# Patient Record
Sex: FEMALE | Race: ASIAN | NOT HISPANIC OR LATINO | URBAN - METROPOLITAN AREA
[De-identification: names, ages, dates, MRNs, and addresses within clinical notes are randomized per-mention and may not be internally consistent; named-entity substitution may affect disease eponyms.]

---

## 2017-12-18 ENCOUNTER — EMERGENCY (EMERGENCY)
Facility: HOSPITAL | Age: 32
LOS: 1 days | Discharge: HOME | End: 2017-12-18
Attending: EMERGENCY MEDICINE | Admitting: EMERGENCY MEDICINE
Payer: COMMERCIAL

## 2017-12-18 VITALS
SYSTOLIC BLOOD PRESSURE: 115 MMHG | RESPIRATION RATE: 16 BRPM | TEMPERATURE: 98 F | OXYGEN SATURATION: 100 % | HEART RATE: 80 BPM | DIASTOLIC BLOOD PRESSURE: 72 MMHG

## 2017-12-18 DIAGNOSIS — Z98.891 HISTORY OF UTERINE SCAR FROM PREVIOUS SURGERY: Chronic | ICD-10-CM

## 2017-12-18 PROCEDURE — 99283 EMERGENCY DEPT VISIT LOW MDM: CPT

## 2017-12-18 RX ORDER — KETOROLAC TROMETHAMINE 30 MG/ML
30 SYRINGE (ML) INJECTION ONCE
Qty: 0 | Refills: 0 | Status: DISCONTINUED | OUTPATIENT
Start: 2017-12-18 | End: 2017-12-18

## 2017-12-18 RX ORDER — ACETAMINOPHEN 500 MG
650 TABLET ORAL ONCE
Qty: 0 | Refills: 0 | Status: COMPLETED | OUTPATIENT
Start: 2017-12-18 | End: 2017-12-18

## 2017-12-18 RX ORDER — DIAZEPAM 5 MG
1 TABLET ORAL
Qty: 15 | Refills: 0 | OUTPATIENT
Start: 2017-12-18 | End: 2017-12-22

## 2017-12-18 RX ORDER — IBUPROFEN 200 MG
600 TABLET ORAL ONCE
Qty: 0 | Refills: 0 | Status: COMPLETED | OUTPATIENT
Start: 2017-12-18 | End: 2017-12-18

## 2017-12-18 RX ORDER — LIDOCAINE 4 G/100G
1 CREAM TOPICAL ONCE
Qty: 0 | Refills: 0 | Status: COMPLETED | OUTPATIENT
Start: 2017-12-18 | End: 2017-12-18

## 2017-12-18 RX ADMIN — Medication 650 MILLIGRAM(S): at 09:35

## 2017-12-18 RX ADMIN — LIDOCAINE 1 PATCH: 4 CREAM TOPICAL at 09:35

## 2017-12-18 RX ADMIN — Medication 600 MILLIGRAM(S): at 09:47

## 2017-12-18 NOTE — ED PROVIDER NOTE - ATTENDING CONTRIBUTION TO CARE
I performed a face to face bedside interview with patient regarding history of present illness, review of symptoms and past medical history. I completed an independent physical exam.  I have discussed patient's plan of care.   I agree with note as stated above, having amended the EMR as needed to reflect my findings. I have discussed the assessment and plan of care.  This includes during the time I functioned as the attending physician for this patient.  Attending Contribution to Care: agree with plan of resident. pt p/w left sided LE and l lower back pain. no concerning signs. no sig trauma. pt stable for d/c pending improvement of symptoms.

## 2017-12-18 NOTE — ED PROVIDER NOTE - MEDICAL DECISION MAKING DETAILS
31 yo F no significant pmhx presenting with 2 weeks waxing/waning lower back pain (lumbar spine that radiates to the left). 8/10 lying still, 10/10 while walking, pt has tried motrin for pain with no relief. No red flag symptoms for lower back pain (no CVA tenderness/urinary symptoms, no history of cancer, no constipation/urinary retention, no trauma), likely musculoskeletal pain vs sciatica, treat pain and reassess

## 2017-12-18 NOTE — ED PROVIDER NOTE - OBJECTIVE STATEMENT
31 yo F no significant pmhx presenting with 2 weeks waxing/waning lower back pain (lumbar spine that radiates to the left). 8/10 lying still, 10/10 while walking, pt has tried motrin for pain with no relief. Denies fevers/chills/hematuria/dysuria/recent trauma. LMP 12/01/17. Denies recent weight loss/decreased appetite/night sweats. Of note patient has three small children at home and states she is always moving around. Pt states when she walks she feels weakness in her R leg with associated R lower back pain and tightness and numbness in her legs.

## 2019-11-13 ENCOUNTER — EMERGENCY (EMERGENCY)
Facility: HOSPITAL | Age: 34
LOS: 1 days | Discharge: HOME | End: 2019-11-13
Admitting: EMERGENCY MEDICINE
Payer: COMMERCIAL

## 2019-11-13 VITALS
TEMPERATURE: 98 F | SYSTOLIC BLOOD PRESSURE: 131 MMHG | RESPIRATION RATE: 16 BRPM | HEART RATE: 78 BPM | DIASTOLIC BLOOD PRESSURE: 102 MMHG | OXYGEN SATURATION: 100 %

## 2019-11-13 DIAGNOSIS — Z98.891 HISTORY OF UTERINE SCAR FROM PREVIOUS SURGERY: Chronic | ICD-10-CM

## 2019-11-13 LAB
APPEARANCE UR: CLEAR — SIGNIFICANT CHANGE UP
BACTERIA # UR AUTO: NEGATIVE — SIGNIFICANT CHANGE UP
BILIRUB UR-MCNC: NEGATIVE — SIGNIFICANT CHANGE UP
BLOOD UR QL VISUAL: HIGH
COLOR SPEC: SIGNIFICANT CHANGE UP
GLUCOSE UR-MCNC: NEGATIVE — SIGNIFICANT CHANGE UP
HYALINE CASTS # UR AUTO: NEGATIVE — SIGNIFICANT CHANGE UP
KETONES UR-MCNC: NEGATIVE — SIGNIFICANT CHANGE UP
LEUKOCYTE ESTERASE UR-ACNC: NEGATIVE — SIGNIFICANT CHANGE UP
NITRITE UR-MCNC: NEGATIVE — SIGNIFICANT CHANGE UP
PH UR: 6 — SIGNIFICANT CHANGE UP (ref 5–8)
PROT UR-MCNC: NEGATIVE — SIGNIFICANT CHANGE UP
RBC CASTS # UR COMP ASSIST: SIGNIFICANT CHANGE UP (ref 0–?)
SP GR SPEC: 1.01 — SIGNIFICANT CHANGE UP (ref 1–1.04)
SQUAMOUS # UR AUTO: SIGNIFICANT CHANGE UP
UROBILINOGEN FLD QL: NORMAL — SIGNIFICANT CHANGE UP
WBC UR QL: SIGNIFICANT CHANGE UP (ref 0–?)

## 2019-11-13 PROCEDURE — 99284 EMERGENCY DEPT VISIT MOD MDM: CPT

## 2019-11-13 PROCEDURE — 76770 US EXAM ABDO BACK WALL COMP: CPT | Mod: 26

## 2019-11-13 RX ORDER — CYCLOBENZAPRINE HYDROCHLORIDE 10 MG/1
10 TABLET, FILM COATED ORAL ONCE
Refills: 0 | Status: COMPLETED | OUTPATIENT
Start: 2019-11-13 | End: 2019-11-13

## 2019-11-13 RX ORDER — KETOROLAC TROMETHAMINE 30 MG/ML
30 SYRINGE (ML) INJECTION ONCE
Refills: 0 | Status: DISCONTINUED | OUTPATIENT
Start: 2019-11-13 | End: 2019-11-13

## 2019-11-13 RX ADMIN — CYCLOBENZAPRINE HYDROCHLORIDE 10 MILLIGRAM(S): 10 TABLET, FILM COATED ORAL at 23:48

## 2019-11-13 RX ADMIN — Medication 30 MILLIGRAM(S): at 23:48

## 2019-11-13 NOTE — ED PROVIDER NOTE - NSFOLLOWUPINSTRUCTIONS_ED_ALL_ED_FT
Patient advised to follow up with PRIMARY CARE DOCTOR IN 1-2 DAYS  and told to return to the emergency department immediately for any new or concerning symptoms such as worsening pain, numbness or tingling, weakness OR ANY OTHER COMPLAINTS. Patient agrees with plan.    DO NOT DRIVE, DRINK ALCOHOL OR OPERATE MACHINERY IF TAKING MUSCLE RELAXER  Rest, avoid heavy lifting/strenuous activity     Advance activity as tolerated.  Continue all previously prescribed medications as directed unless otherwise instructed.  Follow up with your primary care physician in 48-72 hours- bring copies of your results.  Return to the ER for worsening or persistent symptoms, and/or ANY NEW OR CONCERNING SYMPTOMS. If you have issues obtaining follow up, please call: [phone] to obtain a doctor or specialist who takes your insurance in your area.  You may call [phone] to make an appointment with the internal medicine clinic.

## 2019-11-13 NOTE — ED PROVIDER NOTE - PROGRESS NOTE DETAILS
PA [place]- Patient Labs WNL, UA neg. US negative. Pt feeling much better s/p flexeril, toradol, lidoderm patch. Ambulatory in ED. Pt stable for dc home and outpatient f/u with PCP and spine if needed. Sx appear muscular. All questions and concerns addressed. Strict return instructions given.

## 2019-11-13 NOTE — ED ADULT TRIAGE NOTE - CHIEF COMPLAINT QUOTE
Pt comes in for c/o lower back pain and urinary symptoms for the last 2 days. Pt reporting difficulty getting up d/t pain, appears uncomfortable in triage, but in no acute distress.

## 2019-11-13 NOTE — ED PROVIDER NOTE - PATIENT PORTAL LINK FT
You can access the FollowMyHealth Patient Portal offered by [facility] by registering at the following website: [email or web address]. By joining [facility]’s FollowMyHealth portal, you will also be able to view your health information using other applications (apps) compatible with our system.

## 2019-11-13 NOTE — ED PROVIDER NOTE - PHYSICAL EXAMINATION
Vital signs reviewed.   CONSTITUTIONAL: Well-appearing; well-nourished; in no apparent distress. Non-toxic appearing.   HEAD: Normocephalic, atraumatic.  EYES: PERRL, EOM intact, conjunctiva and sclera WNL.  ENT: normal nose; no rhinorrhea;  NECK: Trachea midline   CARD: Normal S1, S2; no murmurs, rubs, or gallops noted.  RESP: Normal chest excursion with respiration; breath sounds clear and equal bilaterally; no wheezes, rhonchi, or rales.  ABD/GI: soft, non-distended; mild suprapubic tenderness noted.   EXT/MS: moves all extremities; no midline tenderness noted. No paraspinal tenderness. NCVAT b/l. 5/5 Strength UE/LE b/l. Pt ambulatory in ED.   SKIN: Normal for age and race; warm; dry; good turgor; no apparent lesions or exudate noted.  NEURO: Awake, alert, oriented x 3, no gross deficits, no motor or sensory deficit noted.  PSYCH: Normal mood; appropriate affect.

## 2019-11-13 NOTE — ED PROVIDER NOTE - OBJECTIVE STATEMENT
HPI: Patient is a 33 y.o female with no significant PMHx who presents to ED c/o low back pain b/l x 1 day as well as dysuria x 2 days. As per patient states that for past 2 days she has been having burning with urination. Then today notes low back pain b/l, described as achy, sore stiffness that is worse with change in position and bending over. Notes some chills. Pt denies heavy lifting, strenuous activity besides holding her child. Pt denies numbness or tingling, n/v/d, abdominal pain, discharge, neck pain, fevers, trauma/falls, hematuria, hx renal stones or any other complaints. Pt took IBU earlier today with no relief. Currently on menstrual cycle, began 2 days ago.

## 2019-11-13 NOTE — ED PROVIDER NOTE - CLINICAL SUMMARY MEDICAL DECISION MAKING FREE TEXT BOX
Patient is a 33 y.o female with no significant PMHx who presents to ED c/o low back pain b/l x 1 day as well as dysuria x 2 days.  DDx- includes but not limited to; MSK pain, UTI, r/o hydronephrosis. Plan- labs, UA/uc, Renal US, flexeril and toradol. Will monitor and reassess.

## 2019-11-14 LAB
ALBUMIN SERPL ELPH-MCNC: 4.3 G/DL — SIGNIFICANT CHANGE UP (ref 3.3–5)
ALP SERPL-CCNC: 47 U/L — SIGNIFICANT CHANGE UP (ref 40–120)
ALT FLD-CCNC: 18 U/L — SIGNIFICANT CHANGE UP (ref 4–33)
ANION GAP SERPL CALC-SCNC: 13 MMO/L — SIGNIFICANT CHANGE UP (ref 7–14)
AST SERPL-CCNC: 21 U/L — SIGNIFICANT CHANGE UP (ref 4–32)
BASOPHILS # BLD AUTO: 0.06 K/UL — SIGNIFICANT CHANGE UP (ref 0–0.2)
BASOPHILS NFR BLD AUTO: 0.7 % — SIGNIFICANT CHANGE UP (ref 0–2)
BILIRUB SERPL-MCNC: 0.4 MG/DL — SIGNIFICANT CHANGE UP (ref 0.2–1.2)
BUN SERPL-MCNC: 13 MG/DL — SIGNIFICANT CHANGE UP (ref 7–23)
CALCIUM SERPL-MCNC: 9.5 MG/DL — SIGNIFICANT CHANGE UP (ref 8.4–10.5)
CHLORIDE SERPL-SCNC: 101 MMOL/L — SIGNIFICANT CHANGE UP (ref 98–107)
CO2 SERPL-SCNC: 21 MMOL/L — LOW (ref 22–31)
CREAT SERPL-MCNC: 0.62 MG/DL — SIGNIFICANT CHANGE UP (ref 0.5–1.3)
EOSINOPHIL # BLD AUTO: 0.19 K/UL — SIGNIFICANT CHANGE UP (ref 0–0.5)
EOSINOPHIL NFR BLD AUTO: 2.3 % — SIGNIFICANT CHANGE UP (ref 0–6)
GLUCOSE SERPL-MCNC: 99 MG/DL — SIGNIFICANT CHANGE UP (ref 70–99)
HCT VFR BLD CALC: 36.8 % — SIGNIFICANT CHANGE UP (ref 34.5–45)
HGB BLD-MCNC: 12.4 G/DL — SIGNIFICANT CHANGE UP (ref 11.5–15.5)
IMM GRANULOCYTES NFR BLD AUTO: 0.2 % — SIGNIFICANT CHANGE UP (ref 0–1.5)
LYMPHOCYTES # BLD AUTO: 3.11 K/UL — SIGNIFICANT CHANGE UP (ref 1–3.3)
LYMPHOCYTES # BLD AUTO: 37.1 % — SIGNIFICANT CHANGE UP (ref 13–44)
MCHC RBC-ENTMCNC: 30.7 PG — SIGNIFICANT CHANGE UP (ref 27–34)
MCHC RBC-ENTMCNC: 33.7 % — SIGNIFICANT CHANGE UP (ref 32–36)
MCV RBC AUTO: 91.1 FL — SIGNIFICANT CHANGE UP (ref 80–100)
MONOCYTES # BLD AUTO: 0.49 K/UL — SIGNIFICANT CHANGE UP (ref 0–0.9)
MONOCYTES NFR BLD AUTO: 5.8 % — SIGNIFICANT CHANGE UP (ref 2–14)
NEUTROPHILS # BLD AUTO: 4.51 K/UL — SIGNIFICANT CHANGE UP (ref 1.8–7.4)
NEUTROPHILS NFR BLD AUTO: 53.9 % — SIGNIFICANT CHANGE UP (ref 43–77)
NRBC # FLD: 0 K/UL — SIGNIFICANT CHANGE UP (ref 0–0)
PLATELET # BLD AUTO: 347 K/UL — SIGNIFICANT CHANGE UP (ref 150–400)
PMV BLD: 9.6 FL — SIGNIFICANT CHANGE UP (ref 7–13)
POTASSIUM SERPL-MCNC: 4.3 MMOL/L — SIGNIFICANT CHANGE UP (ref 3.5–5.3)
POTASSIUM SERPL-SCNC: 4.3 MMOL/L — SIGNIFICANT CHANGE UP (ref 3.5–5.3)
PROT SERPL-MCNC: 7.5 G/DL — SIGNIFICANT CHANGE UP (ref 6–8.3)
RBC # BLD: 4.04 M/UL — SIGNIFICANT CHANGE UP (ref 3.8–5.2)
RBC # FLD: 12.3 % — SIGNIFICANT CHANGE UP (ref 10.3–14.5)
SODIUM SERPL-SCNC: 135 MMOL/L — SIGNIFICANT CHANGE UP (ref 135–145)
WBC # BLD: 8.38 K/UL — SIGNIFICANT CHANGE UP (ref 3.8–10.5)
WBC # FLD AUTO: 8.38 K/UL — SIGNIFICANT CHANGE UP (ref 3.8–10.5)

## 2019-11-14 RX ORDER — LIDOCAINE 4 G/100G
1 CREAM TOPICAL ONCE
Refills: 0 | Status: COMPLETED | OUTPATIENT
Start: 2019-11-14 | End: 2019-11-14

## 2019-11-14 RX ORDER — IBUPROFEN 200 MG
1 TABLET ORAL
Qty: 20 | Refills: 0
Start: 2019-11-14 | End: 2019-11-18

## 2019-11-14 RX ORDER — CYCLOBENZAPRINE HYDROCHLORIDE 10 MG/1
1 TABLET, FILM COATED ORAL
Qty: 9 | Refills: 0
Start: 2019-11-14 | End: 2019-11-16

## 2019-11-14 RX ADMIN — LIDOCAINE 1 PATCH: 4 CREAM TOPICAL at 01:10

## 2019-11-15 LAB
BACTERIA UR CULT: SIGNIFICANT CHANGE UP
SPECIMEN SOURCE: SIGNIFICANT CHANGE UP

## 2022-04-08 PROBLEM — Z78.9 OTHER SPECIFIED HEALTH STATUS: Chronic | Status: ACTIVE | Noted: 2019-11-13

## 2022-04-19 ENCOUNTER — APPOINTMENT (OUTPATIENT)
Dept: ORTHOPEDIC SURGERY | Facility: CLINIC | Age: 37
End: 2022-04-19
Payer: COMMERCIAL

## 2022-04-19 VITALS — HEIGHT: 62 IN | WEIGHT: 155 LBS | BODY MASS INDEX: 28.52 KG/M2

## 2022-04-19 DIAGNOSIS — Z78.9 OTHER SPECIFIED HEALTH STATUS: ICD-10-CM

## 2022-04-19 DIAGNOSIS — M25.562 PAIN IN LEFT KNEE: ICD-10-CM

## 2022-04-19 DIAGNOSIS — Z72.3 LACK OF PHYSICAL EXERCISE: ICD-10-CM

## 2022-04-19 PROCEDURE — 99203 OFFICE O/P NEW LOW 30 MIN: CPT

## 2022-04-19 PROCEDURE — 73562 X-RAY EXAM OF KNEE 3: CPT | Mod: LT

## 2022-04-21 NOTE — ADDENDUM
[FreeTextEntry1] : This note was written by Odalys Donis on 04/19/2022 acting solely as a scribe for Dr. Bhavik Felton.\par \par All medical record entries made by the Scribe were at my, Dr. Bhavik Felton, direction and personally dictated by me on 04/19/2022. I have personally reviewed the chart and agree that the record accurately reflects my personal performance of the history, physical exam, assessment and plan.

## 2022-04-21 NOTE — PHYSICAL EXAM
[de-identified] : Oriented to time, place, person\par Mood: Normal\par Affect: Normal\par Appearance: Healthy, well appearing, no acute distress.\par Gait: Normal\par Assistive Devices: Brace \par \par Left Knee Exam:\par \par Skin: Clean, dry, intact\par Inspection: No obvious malalignment, no masses, no swelling, no effusion\par Pulses: 2+ DP/PT pulses \par ROM: 0-135 degrees of flexion. No pain with deep knee flexion/extension.\par Tenderness: No MJLT. No LJLT. + pain over the medial patella facet. No pain to the quadriceps tendon. No pain to the patella tendon. No posterior knee tenderness.\par Stability: Stable to varus, valgus. Negative Lachman testing. Negative anterior drawer, negative posterior drawer.\par Strength: 5/5 Q/H/TA/GS/EHL, without atrophy\par Neuro: Intact to light touch throughout, DTRs normal\par Additional Tests: Negative Hugo's test, +mild patellar grind test  [de-identified] : The following radiographs were ordered and read by me during this patients visit. I reviewed each radiograph in detail with the patient and discussed the findings as highlighted below. \par \par 4 views of the left knee were obtained today, 04/19/2022, that show no acute fracture or dislocation. There is no medial, no lateral and no patellofemoral degenerative changes seen. There is no significant malalignment. No significant other obvious osseous abnormality, otherwise unremarkable.

## 2022-04-21 NOTE — DISCUSSION/SUMMARY
[de-identified] : 37 y/o female with left anterior knee pain. \par \par The patient presents with clinical symptoms of diffuse anterior knee pain with dysfunction of the patellofemoral compartment. The patient's pain is likely multifactorial; including chondromalacia of the patellofemoral compartment, muscle weakness, limb malalignment, patella maltracking. \par \par I discussed at length the following nonoperative modalities of treatment for anterior knee pain/patellofemoral syndrome with the patient that includes anti-inflammatory medication, activity modification, and physical therapy. Physical therapy will include vastus medialis strengthening, close chain short arc quadriceps exercises as well as hip and core strengthening. Surgical intervention is typically reserved for cases refractory to nonoperative management with evidence of malalignment that may include debridement, lateral release versus patellar realignment surgery.\par \par Recommendation; J-Brace use. Begin trial of PT, Rx given. Conservative care & observation, this includes rest/activity avoidance until less symptomatic with subsequent gradual return to full activity as tolerated. Patient may also use OTC NSAID's or acetaminophen as tolerated, with application of ice to the area 2-3x daily for 20 minutes after periods of activity. \par \par Follow up as needed.

## 2022-04-21 NOTE — HISTORY OF PRESENT ILLNESS
[de-identified] : 36 year-old female who presents to the office today for initial evaluation of left knee pain. 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 The pain has been present for about 1 year. The pain worsened over the past 2 weeks. She denies any injury or inciting event. She notes diffuse anterior knee pain that is intermittent in nature. She describes the pain as sharp in nature. She takes Motrin or Tylenol which do not work. She has also tried Bengay and other over-the-counter ointments which give minimal temporary relief. She presents today wearing a compressive knee sleeve which gives her support when she is walking. She presents today for further treatment options. \par \par The patient's past medical history, past surgical history, medications and allergies were reviewed by me today with the patient and documented accordingly. In addition, the patient's family and social history, which were noncontributory to this visit, were reviewed also.

## 2022-09-08 ENCOUNTER — APPOINTMENT (OUTPATIENT)
Dept: CARDIOLOGY | Facility: CLINIC | Age: 37
End: 2022-09-08

## 2023-04-05 ENCOUNTER — EMERGENCY (EMERGENCY)
Facility: HOSPITAL | Age: 38
LOS: 1 days | Discharge: HOME | End: 2023-04-05
Attending: STUDENT IN AN ORGANIZED HEALTH CARE EDUCATION/TRAINING PROGRAM | Admitting: STUDENT IN AN ORGANIZED HEALTH CARE EDUCATION/TRAINING PROGRAM
Payer: COMMERCIAL

## 2023-04-05 VITALS
RESPIRATION RATE: 16 BRPM | HEART RATE: 82 BPM | OXYGEN SATURATION: 99 % | SYSTOLIC BLOOD PRESSURE: 148 MMHG | TEMPERATURE: 98 F | DIASTOLIC BLOOD PRESSURE: 93 MMHG

## 2023-04-05 DIAGNOSIS — Z98.891 HISTORY OF UTERINE SCAR FROM PREVIOUS SURGERY: Chronic | ICD-10-CM

## 2023-04-05 PROCEDURE — 99284 EMERGENCY DEPT VISIT MOD MDM: CPT

## 2023-04-05 NOTE — ED ADULT TRIAGE NOTE - CHIEF COMPLAINT QUOTE
Pt c/o numbness/tingling to b/l legs progressing since yesterday, pt ambulatory in triage. Pt denies any present pain, no PMH, afebrile.

## 2023-04-05 NOTE — ED PROVIDER NOTE - PHYSICAL EXAMINATION
Sensation equal and intact throughout, neurovascular intact 2+ pulses, strength 5 out of 5 all extremities, normal gait.

## 2023-04-05 NOTE — ED PROVIDER NOTE - ATTENDING APP SHARED VISIT CONTRIBUTION OF CARE
Patient is calling asking for UA and X-RAY results. Patient also requesting visit with you on Wednesday to follow up for the hip pain- she has been scheduled. I have evaluated the patient and agree with the documentation and assessment as made by the CAROL. We have discussed plan of care and work up for the patient.   This was a shared visit with the CAROL. I reviewed and verified the documentation and independently performed the documented:   History, Exam and Medical Decision Making.    37F, no sig pmh, who presents with parethesias. Reports b/l paresthesias from her feet to her mid shin b/l over the past few days. Denies trauma. Reports that they feel subjectively cold. No associated weakness. Ambulating without any issues. No preceding URI or GI sxs. No headaches, fevers, chills, cough, sputum, cp, sob, belly pain, nvd, dysuria, hematuria, recent travel, trauma, syncope. Physical: afebrile, well appearing, rrr, ctabl, abdomen soft, stable gait, SILT over S/S/T/DP/SP b/l, palpable DP pulses, no skin changes. Plan: offered labs but would like to follow up with PMD/neurology.

## 2023-04-05 NOTE — ED PROVIDER NOTE - NSFOLLOWUPINSTRUCTIONS_ED_ALL_ED_FT
Follow up with your Primary Medical Doctor in 1-2 days.  Follow up with neurology in 1-2 days(see attached list)  Rest, stay well hydrated.  Return to the ER for any persistent/worsening or new symptoms weakness, numbness, tingling, fevers, chills or any concerning symptoms.

## 2023-04-05 NOTE — ED PROVIDER NOTE - PATIENT PORTAL LINK FT
You can access the FollowMyHealth Patient Portal offered by [facility] by registering at the following website: [email or web address]. By joining [facility]’s FollowMyHealth portal, you will also be able to view your health information using other applications (apps) compatible with our system.

## 2023-04-05 NOTE — ED PROVIDER NOTE - CLINICAL SUMMARY MEDICAL DECISION MAKING FREE TEXT BOX
37-year-old female with no significant past medical history presents to the emergency department complaining of numbness and tingling to bilateral lower extremities that started yesterday.  Patient is well-appearing, no acute distress, neurovascularly intact, no deficits noted on exam, sensation equal and intact throughout, will advise patient to follow-up with PMD and neurology.

## 2023-04-05 NOTE — ED PROVIDER NOTE - OBJECTIVE STATEMENT
37-year-old female with no significant past medical history presents to the emergency department complaining of numbness and tingling to bilateral lower extremities that started yesterday.  Patient denies recent illness, recent antibiotics, back pain, fecal/urinary incontinence, recent vaccine, fevers, chills, nausea, vomiting, abdominal pain, chest pain, recent travel, rash.

## 2025-02-22 ENCOUNTER — EMERGENCY (EMERGENCY)
Facility: HOSPITAL | Age: 40
LOS: 1 days | Discharge: HOME | End: 2025-02-22
Attending: STUDENT IN AN ORGANIZED HEALTH CARE EDUCATION/TRAINING PROGRAM | Admitting: STUDENT IN AN ORGANIZED HEALTH CARE EDUCATION/TRAINING PROGRAM
Payer: COMMERCIAL

## 2025-02-22 VITALS
WEIGHT: 154.98 LBS | TEMPERATURE: 98 F | DIASTOLIC BLOOD PRESSURE: 99 MMHG | SYSTOLIC BLOOD PRESSURE: 148 MMHG | RESPIRATION RATE: 18 BRPM | HEIGHT: 64 IN | OXYGEN SATURATION: 99 % | HEART RATE: 80 BPM

## 2025-02-22 VITALS
OXYGEN SATURATION: 100 % | RESPIRATION RATE: 16 BRPM | TEMPERATURE: 99 F | DIASTOLIC BLOOD PRESSURE: 93 MMHG | SYSTOLIC BLOOD PRESSURE: 136 MMHG | HEART RATE: 65 BPM

## 2025-02-22 DIAGNOSIS — Z98.891 HISTORY OF UTERINE SCAR FROM PREVIOUS SURGERY: Chronic | ICD-10-CM

## 2025-02-22 LAB
ADD ON TEST-SPECIMEN IN LAB: SIGNIFICANT CHANGE UP
ALBUMIN SERPL ELPH-MCNC: 4.4 G/DL — SIGNIFICANT CHANGE UP (ref 3.3–5)
ALP SERPL-CCNC: 55 U/L — SIGNIFICANT CHANGE UP (ref 40–120)
ALT FLD-CCNC: 17 U/L — SIGNIFICANT CHANGE UP (ref 4–33)
ANION GAP SERPL CALC-SCNC: 13 MMOL/L — SIGNIFICANT CHANGE UP (ref 7–14)
AST SERPL-CCNC: 18 U/L — SIGNIFICANT CHANGE UP (ref 4–32)
BASOPHILS # BLD AUTO: 0.08 K/UL — SIGNIFICANT CHANGE UP (ref 0–0.2)
BASOPHILS NFR BLD AUTO: 0.7 % — SIGNIFICANT CHANGE UP (ref 0–2)
BILIRUB SERPL-MCNC: 0.4 MG/DL — SIGNIFICANT CHANGE UP (ref 0.2–1.2)
BUN SERPL-MCNC: 9 MG/DL — SIGNIFICANT CHANGE UP (ref 7–23)
CALCIUM SERPL-MCNC: 9.4 MG/DL — SIGNIFICANT CHANGE UP (ref 8.4–10.5)
CHLORIDE SERPL-SCNC: 104 MMOL/L — SIGNIFICANT CHANGE UP (ref 98–107)
CO2 SERPL-SCNC: 21 MMOL/L — LOW (ref 22–31)
CREAT SERPL-MCNC: 0.63 MG/DL — SIGNIFICANT CHANGE UP (ref 0.5–1.3)
EGFR: 117 ML/MIN/1.73M2 — SIGNIFICANT CHANGE UP
EOSINOPHIL # BLD AUTO: 0.16 K/UL — SIGNIFICANT CHANGE UP (ref 0–0.5)
EOSINOPHIL NFR BLD AUTO: 1.4 % — SIGNIFICANT CHANGE UP (ref 0–6)
GLUCOSE SERPL-MCNC: 91 MG/DL — SIGNIFICANT CHANGE UP (ref 70–99)
HCT VFR BLD CALC: 37.8 % — SIGNIFICANT CHANGE UP (ref 34.5–45)
HGB BLD-MCNC: 12.7 G/DL — SIGNIFICANT CHANGE UP (ref 11.5–15.5)
IANC: 7.63 K/UL — HIGH (ref 1.8–7.4)
IMM GRANULOCYTES NFR BLD AUTO: 0.2 % — SIGNIFICANT CHANGE UP (ref 0–0.9)
LYMPHOCYTES # BLD AUTO: 26.3 % — SIGNIFICANT CHANGE UP (ref 13–44)
LYMPHOCYTES # BLD AUTO: 3.04 K/UL — SIGNIFICANT CHANGE UP (ref 1–3.3)
MCHC RBC-ENTMCNC: 30.2 PG — SIGNIFICANT CHANGE UP (ref 27–34)
MCHC RBC-ENTMCNC: 33.6 G/DL — SIGNIFICANT CHANGE UP (ref 32–36)
MCV RBC AUTO: 89.8 FL — SIGNIFICANT CHANGE UP (ref 80–100)
MONOCYTES # BLD AUTO: 0.64 K/UL — SIGNIFICANT CHANGE UP (ref 0–0.9)
MONOCYTES NFR BLD AUTO: 5.5 % — SIGNIFICANT CHANGE UP (ref 2–14)
NEUTROPHILS # BLD AUTO: 7.63 K/UL — HIGH (ref 1.8–7.4)
NEUTROPHILS NFR BLD AUTO: 65.9 % — SIGNIFICANT CHANGE UP (ref 43–77)
NRBC # BLD AUTO: 0 K/UL — SIGNIFICANT CHANGE UP (ref 0–0)
NRBC # FLD: 0 K/UL — SIGNIFICANT CHANGE UP (ref 0–0)
NRBC BLD AUTO-RTO: 0 /100 WBCS — SIGNIFICANT CHANGE UP (ref 0–0)
PLATELET # BLD AUTO: 458 K/UL — HIGH (ref 150–400)
POTASSIUM SERPL-MCNC: 3.9 MMOL/L — SIGNIFICANT CHANGE UP (ref 3.5–5.3)
POTASSIUM SERPL-SCNC: 3.9 MMOL/L — SIGNIFICANT CHANGE UP (ref 3.5–5.3)
PROT SERPL-MCNC: 7.9 G/DL — SIGNIFICANT CHANGE UP (ref 6–8.3)
RBC # BLD: 4.21 M/UL — SIGNIFICANT CHANGE UP (ref 3.8–5.2)
RBC # FLD: 12.3 % — SIGNIFICANT CHANGE UP (ref 10.3–14.5)
SODIUM SERPL-SCNC: 138 MMOL/L — SIGNIFICANT CHANGE UP (ref 135–145)
WBC # BLD: 11.57 K/UL — HIGH (ref 3.8–10.5)
WBC # FLD AUTO: 11.57 K/UL — HIGH (ref 3.8–10.5)

## 2025-02-22 PROCEDURE — 99284 EMERGENCY DEPT VISIT MOD MDM: CPT

## 2025-02-22 PROCEDURE — 70450 CT HEAD/BRAIN W/O DYE: CPT | Mod: 26

## 2025-02-22 RX ORDER — BACTERIOSTATIC SODIUM CHLORIDE 0.9 %
1000 VIAL (ML) INJECTION ONCE
Refills: 0 | Status: COMPLETED | OUTPATIENT
Start: 2025-02-22 | End: 2025-02-22

## 2025-02-22 RX ORDER — METOCLOPRAMIDE 10 MG/1
10 TABLET ORAL ONCE
Refills: 0 | Status: COMPLETED | OUTPATIENT
Start: 2025-02-22 | End: 2025-02-22

## 2025-02-22 RX ORDER — KETOROLAC TROMETHAMINE 10 MG
15 TABLET ORAL ONCE
Refills: 0 | Status: DISCONTINUED | OUTPATIENT
Start: 2025-02-22 | End: 2025-02-22

## 2025-02-22 RX ORDER — DIPHENHYDRAMINE HCL 25 MG
25 CAPSULE ORAL ONCE
Refills: 0 | Status: COMPLETED | OUTPATIENT
Start: 2025-02-22 | End: 2025-02-22

## 2025-02-22 RX ADMIN — Medication 1000 MILLILITER(S): at 16:16

## 2025-02-22 RX ADMIN — METOCLOPRAMIDE 10 MILLIGRAM(S): 10 TABLET ORAL at 16:16

## 2025-02-22 RX ADMIN — Medication 25 MILLIGRAM(S): at 16:16

## 2025-02-22 RX ADMIN — Medication 15 MILLIGRAM(S): at 16:16

## 2025-02-22 NOTE — ED ADULT NURSE NOTE - NSFALLUNIVINTERV_ED_ALL_ED
Bed/Stretcher in lowest position, wheels locked, appropriate side rails in place/Call bell, personal items and telephone in reach/Instruct patient to call for assistance before getting out of bed/chair/stretcher/Non-slip footwear applied when patient is off stretcher/[place] to call system/Physically safe environment - no spills, clutter or unnecessary equipment/Purposeful proactive rounding/Room/bathroom lighting operational, light cord in reach

## 2025-02-22 NOTE — ED PROVIDER NOTE - PATIENT PORTAL LINK FT
You can access the FollowMyHealth Patient Portal offered by [facility] by registering at the following website: [email or web address]. By joining [facility] [facility]’s FollowMyHealth portal, you will also be able to view your health information using other applications (apps) compatible with our system.

## 2025-02-22 NOTE — ED PROVIDER NOTE - PROGRESS NOTE DETAILS
Vicky Holcomb DO (PGY-2): Patient signed out to me at shift change. CT head negative. POC urine preg equivocal but serum hcg negative. Patient feeling better. Stable for discharge home. Return precautions given.

## 2025-02-22 NOTE — ED PROVIDER NOTE - ATTENDING CONTRIBUTION TO CARE
39-year-old female who presents to the ED due to gradual onset headache for the past 2 weeks that worsened 3 days ago.  Initially started at the base of her neck and move up to the top of her scalp.  It is throbbing in nature.  Associated with photophobia and phonophobia.  She denies any nausea vomiting, head trauma, urinary symptoms, chest pain shortness of breath.  She has a feels like her usual migraines but worsening intensity.  She has tried Excedrin and acetaminophen with some relief but came in to get evaluated.      Physical exam:  General: Well-Appearing, in no acute distress   Cardiovascular: Regular rate and rhythm. No JVD.   Lungs: Normal rate, rhythm and depth of respirations; no accessory muscle use; and no evidence of airway compromise   Abdomen: Soft, nondistended, nontender, no rebound or guarding, no pulsatile mass  neuro: EOMI, PERRLA, gait intact, finger to nose and heel-to-shin intact.  Sensation equal in bilateral lower extremities.  Strength is 5 out of 5 in the bilateral lower extremities.    MDM:  Patient presents to the ED due to headache that feels like her prior migraine but worsened in intensity.  Vital signs are within acceptable range.  Patient given migraine cocktail with relief.    She has no anemia or leukocytosis.  Normal renal function.  No electro normalities.  hCG negative.  CT brain without evidence of intracranial mass or hemorrhage.    Low clinical suspicion for subarachnoid hemorrhage, SDH/epidural hemorrhage, stroke, tumor, sinus venous thrombosis (pt not on birth control).    Patient discharged with outpatient follow-up to primary care.

## 2025-02-22 NOTE — ED PROVIDER NOTE - CLINICAL SUMMARY MEDICAL DECISION MAKING FREE TEXT BOX
38 yo F with no sig PMHx presenting with global HA x 2 weeks, slowly worsening x 3 days. Throbbing. VS: HTN, PE: benign inc neuro exam.   DDx includes but is not limited to migraine, ICH, pregnancy  Work up: CT head, upreg, basic labs  Tx: toradol, reglan, benadryl, IVF  Plan: if work up neg likely DC for outpatient neuro f/u

## 2025-02-22 NOTE — ED PROVIDER NOTE - NSFOLLOWUPCLINICS_GEN_ALL_ED_FT
[facility]  Neurology  [address] [place] [address] [address] [place] [address]  Phone: [phone]  Fax:

## 2025-02-22 NOTE — ED ADULT TRIAGE NOTE - CHIEF COMPLAINT QUOTE
Pt  with headache x 2 weeks states pain starts in   back of here head  and goes up to front of head. pt states last 3 days headache is worse.  pt with no nausea no vomiting.  took Excedrin today with  minimal relief today. pt with htn in Ed states no hx no medications for it.

## 2025-02-22 NOTE — ED PROVIDER NOTE - OBJECTIVE STATEMENT
39-year-old female with no significant PMHx presenting with headache x 2 weeks, worse over past 3 days. Starts at base of neck and moves up towards top and sides of head.  Described as throbbing sensation.  Headache characteristics have not changed but patient states they have felt stronger over the past 3 days. Slow in onset, not sudden and severe. Endorses phonophobia but denies photophobia or nausea during episodes.  Similar episode years ago at which time patient had CT head done which came back negative.  Patient is never seen a neurologist or been told that she has migraines.  Patient tried taking Excedrin 2 days ago and Tylenol yesterday both of which relieve the headache.  Excedrin works better than Tylenol.  Denies chance of pregnancy, states her tubes are tied.  Patient is not on oral contraceptives.  Denies any change in vision.  Denies fever, CP, SOB, abd pain, vomiting, d/c, dysuria.

## 2025-02-22 NOTE — ED PROVIDER NOTE - PHYSICAL EXAMINATION
Const: Awake, alert, no acute distress.  Appears well.  Moving comfortably on stretcher.  HEENT: NC/AT.  Moist mucous membranes.  Eyes: Extraocular movements intact b/l.  No scleral icterus.  Neck: Full ROM without pain. Supple.  Cardiac: Regular rate and regular rhythm. S1 S2 present.  Resp: No evidence of respiratory distress.  No stridor or wheeze.  Good air entry b/l, breath sounds clear to auscultation b/l.  Abd: Non-distended. Soft, nontender  Skin: Normal coloration.  No rashes, abrasions or lacerations.  Neuro: Awake, alert & oriented x 3.  Moves all extremities symmetrically.  No obvious focal deficits. 5/5 strength to b/l UE and LEs, sensation intact throughout. Normal finger-to-nose, no dysdiadochokinesis, no pronator drift, normal heel-to-shin, normal gait, no ataxia.  Normal Romberg.

## 2025-02-22 NOTE — ED ADULT NURSE NOTE - OBJECTIVE STATEMENT
Patient reports headache x2 weeks. As per patient she has been taking several medication to help relieve the pain but not relief noted. Patient also reports higher than normal blood pressure. Pain describes the pain as radiating from neck to head. Patient verbalized having similar pain in the past and had a Ct scan completed but no findings noted.

## 2025-02-22 NOTE — ED PROVIDER NOTE - NSFOLLOWUPINSTRUCTIONS_ED_ALL_ED_FT
Migraine Headache    A migraine headache is a very strong throbbing pain on one side or both sides of your head. This type of headache can also cause other symptoms. It can last from 4 hours to 3 days. Talk with your doctor about what things may bring on (trigger) this condition.    What are the causes?  The exact cause of this condition is not known. This condition may be triggered or caused by:  •Drinking alcohol.  •Smoking.    •Taking medicines, such as:  •Medicine used to treat chest pain (nitroglycerin).  •Birth control pills.  •Estrogen.  •Some blood pressure medicines.  •Eating or drinking certain products.  •Doing physical activity.    Other things that may trigger a migraine headache include:  •Having a menstrual period.  •Pregnancy.  •Hunger.  •Stress.  •Not getting enough sleep or getting too much sleep  •Weather changes.  •Tiredness (fatigue).      What increases the risk?  •Being 25–55 years old.  •Being female.  •Having a family history of migraine headaches.  •Being .  •Having depression or anxiety.  •Being very overweight.    What are the signs or symptoms?  •A throbbing pain. This pain may:  •Happen in any area of the head, such as on one side or both sides.  •Make it hard to do daily activities.  •Get worse with physical activity.  •Get worse around bright lights or loud noises.  •Other symptoms may include:  •Feeling sick to your stomach (nauseous).  •Vomiting.  •Dizziness.  •Being sensitive to bright lights, loud noises, or smells.  •Before you get a migraine headache, you may get warning signs (an aura). An aura may include:  •Seeing flashing lights or having blind spots.  •Seeing bright spots, halos, or zigzag lines.  •Having tunnel vision or blurred vision.  •Having numbness or a tingling feeling.  •Having trouble talking.  •Having weak muscles.  •Some people have symptoms after a migraine headache (postdromal phase), such as:  •Tiredness.  •Trouble thinking (concentrating).    How is this treated?  •Taking medicines that:  •Relieve pain.  •Relieve the feeling of being sick to your stomach.  •Prevent migraine headaches.  •Treatment may also include:  •Having acupuncture.  •Avoiding foods that bring on migraine headaches.  •Learning ways to control your body functions (biofeedback).  •Therapy to help you know and deal with negative thoughts (cognitive behavioral therapy).    Follow these instructions at home:    Medicines   •Take over-the-counter and prescription medicines only as told by your doctor.  •Ask your doctor if the medicine prescribed to you:  •Requires you to avoid driving or using heavy machinery.  •Can cause trouble pooping (constipation). You may need to take these steps to prevent or treat trouble pooping:  •Drink enough fluid to keep your pee (urine) pale yellow.  •Take over-the-counter or prescription medicines.  •Eat foods that are high in fiber. These include beans, whole grains, and fresh fruits and vegetables.  •Limit foods that are high in fat and sugar. These include fried or sweet foods.    Lifestyle   • Do not drink alcohol.  • Do not use any products that contain nicotine or tobacco, such as cigarettes, e-cigarettes, and chewing tobacco. If you need help quitting, ask your doctor.  •Get at least 8 hours of sleep every night.  •Limit and deal with stress.    General instructions     •Keep a journal to find out what may bring on your migraine headaches. For example, write down:  •What you eat and drink.  •How much sleep you get.  •Any change in what you eat or drink.  •Any change in your medicines.  •If you have a migraine headache:  •Avoid things that make your symptoms worse, such as bright lights.  •It may help to lie down in a dark, quiet room.  •Do not drive or use heavy machinery.  •Ask your doctor what activities are safe for you.  •Keep all follow-up visits as told by your doctor. This is important.    Contact a doctor if:  •You get a migraine headache that is different or worse than others you have had  •You have more than 15 headache days in one month.    Get help right away if:  •Your migraine headache gets very bad.  •Your migraine headache lasts longer than 72 hours.  •You have a fever.  •You have a stiff neck.  •You have trouble seeing  •Your muscles feel weak or like you cannot control them.  •You start to lose your balance a lot.  •You start to have trouble walking.  •You pass out (faint).  •You have a seizure.    Summary  •A migraine headache is a very strong throbbing pain on one side or both sides of your head. These headaches can also cause other symptoms.  •This condition may be treated with medicines and changes to your lifestyle.  •Keep a journal to find out what may bring on your migraine headaches.  •Contact a doctor if you get a migraine headache that is different or worse than others you have had.  •Contact your doctor if you have more than 15 headache days in a month.    This information is not intended to replace advice given to you by your health care provider. Make sure you discuss any questions you have with your health care provider.